# Patient Record
Sex: MALE | Race: WHITE | NOT HISPANIC OR LATINO | Employment: FULL TIME | URBAN - METROPOLITAN AREA
[De-identification: names, ages, dates, MRNs, and addresses within clinical notes are randomized per-mention and may not be internally consistent; named-entity substitution may affect disease eponyms.]

---

## 2019-09-05 ENCOUNTER — OCCUPATIONAL MEDICINE (OUTPATIENT)
Dept: URGENT CARE | Facility: CLINIC | Age: 29
End: 2019-09-05
Payer: COMMERCIAL

## 2019-09-05 VITALS
HEART RATE: 66 BPM | SYSTOLIC BLOOD PRESSURE: 112 MMHG | HEIGHT: 74 IN | OXYGEN SATURATION: 99 % | DIASTOLIC BLOOD PRESSURE: 70 MMHG | TEMPERATURE: 98.5 F | BODY MASS INDEX: 21.69 KG/M2 | WEIGHT: 169 LBS | RESPIRATION RATE: 18 BRPM

## 2019-09-05 DIAGNOSIS — S61.209A AVULSION OF FINGER, INITIAL ENCOUNTER: ICD-10-CM

## 2019-09-05 PROCEDURE — 99203 OFFICE O/P NEW LOW 30 MIN: CPT | Mod: 29 | Performed by: PHYSICIAN ASSISTANT

## 2019-09-05 NOTE — LETTER
[facility]  [address] [place] [address]  Phone:  [phone] - Fax:  [phone]   [facility] Progress Report and Disability Certification  Date of Service: 9/5/2019   No Show:  No  Date / Time of Next Visit: 9/7/2019   Claim Information   Patient Name: Mark Anthony Montez  Claim Number:     Employer:   [employer] Date of Injury: 9/5/2019     Insurer / TPA: Misc Workers Comp  ID / SSN:     Occupation:   Diagnosis: The encounter diagnosis was Avulsion of finger, initial encounter.    Medical Information   Related to Industrial Injury? Yes    Subjective Complaints:  DOI: today-patient was using a sharp knife cutting lettuce when he accidentally cut the radial aspect of the left index finger with a knife.  He reports immediate bleeding-however this slowed with the initiation of pressure.  This happened approximately 3 hours ago.  Patient is up-to-date on his tetanus.  Denies second job   Objective Findings: Left avulsion partial-thickness to the radial aspect of the distal right fingertip with minimal involvement of the nail.  Active bleeding.  Wound was irrigated and scrubbed with Hibiclens and sterile saline solution.  Neurovascularly intact distally.  Without involvement of deeper structures of the finger and full range of motion.  No foreign body identified  Procedure: Avulsion- cautery.   -Risks including bleeding, nerve damage, infection, and poor cosmetic outcome discussed. Benefits and alternatives discussed.   -Clean technique with sterile instruments and suture used  -Local anesthesia with 2% lidocaine without-along with digital block  -Cautery was utilized to provide hemostasis to the site  -Polysporin and pressure bandage  placed  -Patient tolerated well         Pre-Existing Condition(s):     Assessment:   Initial Visit    Status: Additional Care Required  Permanent Disability:No    Plan:      Diagnostics:      Comments:          Disability Information   Status: Released to Restricted Duty    From:  9/5/2019  Through: 9/7/2019 Restrictions are: Temporary   Physical Restrictions   Sitting:    Standing:    Stooping:    Bending:      Squatting:    Walking:    Climbing:    Pushing:      Pulling:    Other:    Reaching Above Shoulder (L):   Reaching Above Shoulder (R):       Reaching Below Shoulder (L):    Reaching Below Shoulder (R):      Not to exceed Weight Limits   Carrying(hrs):   Weight Limit(lb):   Lifting(hrs):   Weight  Limit(lb):     Comments: Patient is to keep the area clean and dry and bandaged.  No use of the left hand.  Return to clinic in 2 days for recheck.  Tetanus is up-to-date.    Repetitive Actions   Hands: i.e. Fine Manipulations from Grasping:     Feet: i.e. Operating Foot Controls:     Driving / Operate Machinery:     Physician Name: Albert Easton P.A.-C. Physician Signature: ALBERT Nichols P.A.-C. e-Signature: Dr. Tito Garcia, Medical Director   Clinic Name / Location: [facility]  [address] [place] [address] Clinic Phone Number: Dept: [phone]   Appointment Time: 5:30 Pm Visit Start Time: 5:45 PM   Check-In Time:  5:26 Pm Visit Discharge Time:  06:43 pm    Original-Treating Physician or Chiropractor    Page 2-Insurer/TPA    Page 3-Employer    Page 4-Employee

## 2019-09-05 NOTE — LETTER
"EMPLOYEE’S CLAIM FOR COMPENSATION/ REPORT OF INITIAL TREATMENT  FORM C-4    EMPLOYEE’S CLAIM - PROVIDE ALL INFORMATION REQUESTED   First Name  Mark Anthony Last Name  Melida Birthdate                    1990                Sex  male Claim Number   Home Address  [address] Age  29 y.o. Height  1.88 m (6' 2\") Weight  76.7 kg (169 lb) [facility]     [place] Zip  [address] Telephone  [phone] (home)    Mailing Address  [address] [place] Zip  [address] Primary Language Spoken  English    Insurer   Third Party   Misc Workers Comp   Employee's Occupation (Job Title) When Injury or Occupational Disease Occurred      Employer's Name    [employer] Telephone   [phone]   Employer Address   [address]  [facility] Zip   [address]   Date of Injury  9/5/2019               Hour of Injury  3:30 PM Date Employer Notified  9/5/2019 Last Day of Work after Injury or Occupational Disease  9/5/2019 Supervisor to Whom Injury Reported  jona joyce   Address or Location of Accident (if applicable)  [[address] ]   What were you doing at the time of accident? (if applicable)  cutting lettes    How did this injury or occupational disease occur? (Be specific an answer in detail. Use additional sheet if necessary)  knife slip and not paying to much attention    If you believe that you have an occupational disease, when did you first have knowledge of the disability and it relationship to your employment?   Witnesses to the Accident  n/a      Nature of Injury or Occupational Disease  Laceration  Part(s) of Body Injured or Affected  Finger (L), N/A, N/A    I certify that the above is true and correct to the best of my knowledge and that I have provided this information in order to obtain the benefits of [place] and " [place]  I hereby authorize any physician, chiropractor, surgeon, practitioner, or other person, any hospital, including [facility] or [facility], any medical service organization, any insurance company, or other institution or organization to release to each other, any medical or other information, including benefits paid or payable, pertinent to this injury or disease, except information relative to diagnosis, treatment and/or counseling for AIDS, psychological conditions, alcohol or controlled substances, for which I must give specific authorization.  A Photostat of this authorization shall be as valid as the original.     Date 09/05/19   [facility]    Employee’s Signature   THIS REPORT MUST BE COMPLETED AND MAILED WITHIN 3 WORKING DAYS OF TREATMENT   [facility]  Name of Facility  [facility]   Date  9/5/2019 Diagnosis  (S61.209A) Avulsion of finger, initial encounter Is there evidence the injured employee was under the influence of alcohol and/or another controlled substance at the time of accident?   Hour  5:45 PM Description of Injury or Disease  The encounter diagnosis was Avulsion of finger, initial encounter. No   Treatment  Avulsion laceration of the left second digit-hemostasis was completed unfortunately unable to suture area.  Hemostasis was successful and pressure bandage applied.  Return to clinic in 2 days.  Tetanus is up-to-date.  Have you advised the patient to remain off work five days or more? No   X-Ray Findings      If Yes   From Date  To Date      From information given by the employee, together with medical evidence, can you directly connect this injury or occupational disease as job incurred?  Yes If No Full Duty  No Modified Duty  Yes   Is additional medical care by a physician indicated?  No If Modified Duty, Specify any Limitations / Restrictions  No use  "of the left hand   Do you know of any previous injury or disease contributing to this condition or occupational disease?                            No   Date  9/5/2019 Print Doctor’s Name Albert Easton P.A.-C. I certify the employer’s copy of  this form was mailed on:   Address  [address]     [facility] Zip  [address]    Provider’s Tax ID Number  [id number] Telephone  Dept: [phone]        e-ALBERT Marcelino P.A.-C.   e-Signature: Dr. Tito Garcia, Medical Director Degree  MD        ORIGINAL-TREATING PHYSICIAN OR CHIROPRACTOR    PAGE 2-INSURER/TPA    PAGE 3-EMPLOYER    PAGE 4-EMPLOYEE             Form C-4 (rev.10/07)              BRIEF DESCRIPTION OF RIGHTS AND BENEFITS  (Pursuant to [place] 616C.050)    Notice of Injury or Occupational Disease (Incident Report Form C-1): If an injury or occupational disease (OD) arises out of and in the course of employment, you must provide written notice to your employer as soon as practicable, but no later than 7 days after the accident or OD. Your employer shall maintain a sufficient supply of the required forms.    Claim for Compensation (Form C-4): If medical treatment is sought, the form C-4 is available at the place of initial treatment. A completed \"Claim for Compensation\" (Form C-4) must be filed within 90 days after an accident or OD. The treating physician or chiropractor must, within 3 working days after treatment, complete and mail to the employer, the employer's insurer and third-party , the Claim for Compensation.    Medical Treatment: If you require medical treatment for your on-the-job injury or OD, you may be required to select a physician or chiropractor from a list provided by your workers’ compensation insurer, if it has contracted with an Organization for Managed Care (MCO) or Preferred Provider Organization (PPO) or providers of health care. If your employer has not entered into a contract " with an MCO or PPO, you may select a physician or chiropractor from the Panel of Physicians and Chiropractors. Any medical costs related to your industrial injury or OD will be paid by your insurer.    Temporary Total Disability (TTD): If your doctor has certified that you are unable to work for a period of at least 5 consecutive days, or 5 cumulative days in a 20-day period, or places restrictions on you that your employer does not accommodate, you may be entitled to TTD compensation.    Temporary Partial Disability (TPD): If the wage you receive upon reemployment is less than the compensation for TTD to which you are entitled, the insurer may be required to pay you TPD compensation to make up the difference. TPD can only be paid for a maximum of 24 months.    Permanent Partial Disability (PPD): When your medical condition is stable and there is an indication of a PPD as a result of your injury or OD, within 30 days, your insurer must arrange for an evaluation by a rating physician or chiropractor to determine the degree of your PPD. The amount of your PPD award depends on the date of injury, the results of the PPD evaluation and your age and wage.    Permanent Total Disability (PTD): If you are medically certified by a treating physician or chiropractor as permanently and totally disabled and have been granted a PTD status by your insurer, you are entitled to receive monthly benefits not to exceed 66 2/3% of your average monthly wage. The amount of your PTD payments is subject to reduction if you previously received a PPD award.    Vocational Rehabilitation Services: You may be eligible for vocational rehabilitation services if you are unable to return to the job due to a permanent physical impairment or permanent restrictions as a result of your injury or occupational disease.    Transportation and Per Torey Reimbursement: You may be eligible for travel expenses and per torey associated with medical  treatment.    Reopening: You may be able to reopen your claim if your condition worsens after claim closure.    Appeal Process: If you disagree with a written determination issued by the insurer or the insurer does not respond to your request, you may appeal to the Department of Administration, , by following the instructions contained in your determination letter. You must appeal the determination within 70 days from the date of the determination letter at [address], [address], [place], [place] [address], or [address], [place], [place] [address]. If you disagree with the  decision, you may appeal to the Department of Administration, . You must file your appeal within 30 days from the date of the  decision letter at [address], [address], [place], [place] [address], or [address] [facility] [address], [place], [place] [address]. If you disagree with a decision of an , you may file a petition for judicial review with the District Court. You must do so within 30 days of the Appeal Officer’s decision. You may be represented by an  at your own expense or you may contact the [facility] for possible representation.    [place]  for Injured Workers (NAIW): If you disagree with a  decision, you may request that NAIW represent you without charge at an  Hearing. For information regarding denial of benefits, you may contact the [facility] at: [address], [address], [place] [address], [phone], or [address], [address], [place] [address], [phone]    To File a Complaint with the Division: If you wish to file a complaint with the  of the Division of Industrial Relations (DIR),  please contact the Workers’ Compensation Section, [address], [address], [place], [place] [address], telephone [phone], or [address], [address], [place], [place] [address], telephone [phone].    For assistance with Workers’ Compensation Issues: you may contact the Office of the Governor Consumer Health Assistance, [facility] [address], [address], [place], [place] [address], Toll Free 1-252.736.3377, Web site: http://[employer].[facility].nv., E-mail [email or web address].                             __________________________________________________________________                                                                   _________________                Employee Name / Signature                                                                                                                                                       Date                                                                                                                                                                                                     D-2 (rev. 06/18)

## 2019-09-06 ENCOUNTER — TELEPHONE (OUTPATIENT)
Dept: URGENT CARE | Facility: CLINIC | Age: 29
End: 2019-09-06

## 2019-09-06 ASSESSMENT — ENCOUNTER SYMPTOMS
FEVER: 0
TINGLING: 0
CHILLS: 0
SENSORY CHANGE: 0

## 2019-09-06 NOTE — TELEPHONE ENCOUNTER
In regards to Workers compensation, noe tried to get in touch with the employer and I was given a few numbers all which I called and never got a hold of anybody , I left voivemails and still nobody has returned my calls . I am unsure of who the insurer is and as to where or how the employer would like to receive paperwork. So I haven't been able to send any paperwork. I did reach out to occupational health to see if they have any information and I'm still waiting to get an answer.  I first reached out to the  jona joyce #[phone] he didn't have an idea of what I was talking about and gave me a number to talk to

## 2019-09-06 NOTE — PROGRESS NOTES
"Subjective:      Mark Anthony Montez is a 29 y.o. male who presents with Hand Laceration (New WC: LT index finger laceration)      DOI: today-patient was using a sharp knife cutting lettuce when he accidentally cut the radial aspect of the left index finger with a knife.  He reports immediate bleeding-however this slowed with the initiation of pressure.  This happened approximately 3 hours ago.  Patient is up-to-date on his tetanus.  Denies second job     Laceration    The pain is at a severity of 6/10. The pain is moderate. The pain has been constant since onset. He reports no foreign bodies present. His tetanus status is UTD.       Review of Systems   Constitutional: Negative for chills and fever.   Musculoskeletal:        Left finger avulsion.      Skin: Negative for itching and rash.   Neurological: Negative for tingling and sensory change.   All other systems reviewed and are negative.         Objective:     /70 (BP Location: Right arm, Patient Position: Sitting, BP Cuff Size: Adult)   Pulse 66   Temp 36.9 °C (98.5 °F) (Temporal)   Resp 18   Ht 1.88 m (6' 2\")   Wt 76.7 kg (169 lb)   SpO2 99%   BMI 21.70 kg/m²    PMH:  has no past medical history on file.  MEDS: No current outpatient medications on file.  ALLERGIES: No Known Allergies  SURGHX: No past surgical history on file.  SOCHX:  reports that he has never smoked. He has never used smokeless tobacco.  FH: Family history was reviewed, no pertinent findings to report    Physical Exam   Constitutional: He is oriented to person, place, and time. He appears well-developed and well-nourished. No distress.   HENT:   Head: Normocephalic and atraumatic.   Eyes: Pupils are equal, round, and reactive to light. Conjunctivae and EOM are normal.   Neck: Normal range of motion. Neck supple. No tracheal deviation present.   Cardiovascular: Normal rate.   Pulmonary/Chest: Effort normal. No respiratory distress.   Neurological: He is alert and oriented to person, " place, and time. Coordination normal.   Skin: Skin is warm. No rash noted.   Psychiatric: He has a normal mood and affect. His behavior is normal. Judgment and thought content normal.   Vitals reviewed.      Left avulsion partial-thickness to the radial aspect of the distal right fingertip with minimal involvement of the nail.  Active bleeding.  Wound was irrigated and scrubbed with Hibiclens and sterile saline solution.  Neurovascularly intact distally.  Without involvement of deeper structures of the finger and full range of motion.  No foreign body identified  Procedure: Avulsion- cautery.   -Risks including bleeding, nerve damage, infection, and poor cosmetic outcome discussed. Benefits and alternatives discussed.   -Clean technique with sterile instruments and suture used  -Local anesthesia with 2% lidocaine without-along with digital block  -Cautery was utilized to provide hemostasis to the site  -Polysporin and pressure bandage  placed  -Patient tolerated well             Assessment/Plan:     1. Avulsion of finger, initial encounter    Hemostasis successfful. Pressure bandage applied.   Please see D39 for further information.   Pt. Is to have recheck in 2 days- to ensure area is not infected.   OTC pain meds as tolerated.

## 2019-09-07 ENCOUNTER — OCCUPATIONAL MEDICINE (OUTPATIENT)
Dept: URGENT CARE | Facility: CLINIC | Age: 29
End: 2019-09-07
Payer: COMMERCIAL

## 2019-09-07 VITALS
SYSTOLIC BLOOD PRESSURE: 116 MMHG | WEIGHT: 173.2 LBS | RESPIRATION RATE: 16 BRPM | DIASTOLIC BLOOD PRESSURE: 54 MMHG | BODY MASS INDEX: 22.23 KG/M2 | OXYGEN SATURATION: 99 % | HEART RATE: 95 BPM | TEMPERATURE: 99.1 F | HEIGHT: 74 IN

## 2019-09-07 DIAGNOSIS — S61.209D AVULSION OF FINGER, SUBSEQUENT ENCOUNTER: ICD-10-CM

## 2019-09-07 PROCEDURE — 99214 OFFICE O/P EST MOD 30 MIN: CPT | Mod: 29 | Performed by: NURSE PRACTITIONER

## 2019-09-07 ASSESSMENT — ENCOUNTER SYMPTOMS
DIZZINESS: 0
FEVER: 0
SHORTNESS OF BREATH: 0
NAUSEA: 0
CHILLS: 0
VOMITING: 0
EYE PAIN: 0
SORE THROAT: 0
MYALGIAS: 0

## 2019-09-07 NOTE — LETTER
"   [facility]  [address] [place] [address]  Phone:  [phone] - Fax:  [phone]   [facility] Progress Report and Disability Certification  Date of Service: 9/7/2019   No Show:  No  Date / Time of Next Visit: 9/10/2019   Claim Information   Patient Name: Mark Anthony Montez  Claim Number:     Employer:   [employer]  Date of Injury: 9/5/2019     Insurer / TPA:   [employer]KITA ID / SSN:     Occupation:   Diagnosis: The encounter diagnosis was Avulsion of finger, subsequent encounter.    Medical Information   Related to Industrial Injury? Yes    Subjective Complaints:  DOI: today-patient was using a sharp knife cutting lettuce when he accidentally cut the radial aspect of the left index finger with a knife.\"  Initial treatment included cauterization to stop the bleeding.  Patient was placed on temporary work restrictions.  Patient returns clinic today for first follow-up visit.  Patient has not changed the dressing since initial visit.  Patient having 10 out of 10 pain at this time.  He has been taking ibuprofen with no relief in symptoms.  Patient has been back to work and tolerated light duties.   Objective Findings: Left index finger: Avulsion noted of the lateral aspect of DIP.  No bleeding.  No new erythema, no swelling, no drainage.  Full range of motion.  Sensation intact distally, neurovascular intact.  Nail intact.   Pre-Existing Condition(s):     Assessment:   Condition Improved    Status: Additional Care Required  Permanent Disability:No    Plan:   Comments:Redressed finger with Polysporin, nontender gauze, bandage.  Will have patient start daily bandage changes.  We will keep patient on light duty    Diagnostics:      Comments:       Disability Information   Status: Released to Restricted Duty    From:  9/7/2019  Through: 9/10/2019 Restrictions are: Temporary   Physical Restrictions   Sitting:    Standing:    Stooping:    Bending:  "   Squatting:    Walking:    Climbing:    Pushing:      Pulling:    Other:    Reaching Above Shoulder (L):   Reaching Above Shoulder (R):       Reaching Below Shoulder (L):    Reaching Below Shoulder (R):      Not to exceed Weight Limits   Carrying(hrs):   Weight Limit(lb):   Lifting(hrs):   Weight  Limit(lb):     Comments: Advised patient to keep hand clean and dry and bandaged.  We will keep patient on temporary work restrictions with limited use of the left hand. Advised may take 600-800 mg ibuprofen 3 times daily as needed for pain.  Did recommend alternating with Tylenol.  We will see patient back in 3 days for reevaluation of symptoms    Repetitive Actions   Hands: i.e. Fine Manipulations from Graspin hrs/day  Comments:Left hand   Feet: i.e. Operating Foot Controls:     Driving / Operate Machinery:     Physician Name: JOSH Lew Physician Signature: PAMELA Campbell e-Signature: Dr. Tito Garcia, Medical Director   Clinic Name / Location: [facility]  [address] [place] [address] Clinic Phone Number: Dept: [phone]   Appointment Time: 4:30 Pm Visit Start Time: 4:34 PM   Check-In Time:  3:43 Pm Visit Discharge Time:  05:02 PM    Original-Treating Physician or Chiropractor    Page 2-Insurer/TPA    Page 3-Employer    Page 4-Employee

## 2019-09-08 NOTE — PROGRESS NOTES
"Subjective:   Mark Anthony Montez is a 29 y.o. male who presents for Hand Laceration (WC FV laceration of LT index finger)    DOI: today-patient was using a sharp knife cutting lettuce when he accidentally cut the radial aspect of the left index finger with a knife.\"  Initial treatment included cauterization to stop the bleeding.  Patient was placed on temporary work restrictions.  Patient returns clinic today for first follow-up visit.  Patient has not changed the dressing since initial visit.  Patient having 10 out of 10 pain at this time.  He has been taking ibuprofen with no relief in symptoms.  Patient has been back to work and tolerated light duties.   HPI  Review of Systems   Constitutional: Negative for chills and fever.   HENT: Negative for sore throat.    Eyes: Negative for pain.   Respiratory: Negative for shortness of breath.    Cardiovascular: Negative for chest pain.   Gastrointestinal: Negative for nausea and vomiting.   Genitourinary: Negative for hematuria.   Musculoskeletal: Negative for myalgias.        Dressing in place of left index finger     Skin: Negative for rash.   Neurological: Negative for dizziness.     No Known Allergies   Objective:   /54 (BP Location: Left arm, Patient Position: Sitting, BP Cuff Size: Adult)   Pulse 95   Temp 37.3 °C (99.1 °F) (Temporal)   Resp 16   Ht 1.88 m (6' 2\")   Wt 78.6 kg (173 lb 3.2 oz)   SpO2 99%   BMI 22.24 kg/m²   Physical Exam   Constitutional: He is oriented to person, place, and time. He appears well-developed and well-nourished. No distress.   HENT:   Head: Normocephalic and atraumatic.   Eyes: Pupils are equal, round, and reactive to light. Conjunctivae and EOM are normal.   Cardiovascular: Normal rate and regular rhythm.   No murmur heard.  Pulmonary/Chest: Effort normal and breath sounds normal. No respiratory distress.   Abdominal: Soft. He exhibits no distension. There is no tenderness.   Musculoskeletal:        Left hand: He exhibits " tenderness. He exhibits normal range of motion, normal two-point discrimination and no swelling. Normal sensation noted. Normal strength noted.        Hands:  Neurological: He is alert and oriented to person, place, and time. He has normal reflexes. No sensory deficit.   Skin: Skin is warm and dry.   Psychiatric: He has a normal mood and affect.   Left index finger: Avulsion noted of the radial aspect of DIP.  No bleeding.  No new erythema, no swelling, no drainage.  Full range of motion.  Sensation intact distally, neurovascular intact.  Nail intact.    Assessment/Plan:   1. Avulsion of finger, subsequent encounter    Advised patient to keep hand clean and dry and bandaged.  Advised to change the bandage daily.  We will keep patient on temporary work restrictions with limited use of the left hand. Advised may take 600-800 mg ibuprofen 3 times daily as needed for pain.  Did recommend alternating with Tylenol.  We will see patient back in 3 days for reevaluation of symptoms.   Differential diagnosis, natural history, supportive care, and indications for immediate follow-up discussed.

## 2019-09-10 ENCOUNTER — OCCUPATIONAL MEDICINE (OUTPATIENT)
Dept: URGENT CARE | Facility: CLINIC | Age: 29
End: 2019-09-10
Payer: COMMERCIAL

## 2019-09-10 VITALS
SYSTOLIC BLOOD PRESSURE: 114 MMHG | DIASTOLIC BLOOD PRESSURE: 60 MMHG | TEMPERATURE: 98.7 F | OXYGEN SATURATION: 98 % | HEART RATE: 78 BPM | RESPIRATION RATE: 16 BRPM

## 2019-09-10 DIAGNOSIS — S61.209D AVULSION OF FINGER, SUBSEQUENT ENCOUNTER: ICD-10-CM

## 2019-09-10 PROCEDURE — 99214 OFFICE O/P EST MOD 30 MIN: CPT | Mod: 29 | Performed by: PHYSICIAN ASSISTANT

## 2019-09-10 RX ORDER — CEPHALEXIN 500 MG/1
500 CAPSULE ORAL 4 TIMES DAILY
Qty: 20 CAP | Refills: 0 | Status: SHIPPED | OUTPATIENT
Start: 2019-09-10 | End: 2019-09-15

## 2019-09-10 NOTE — LETTER
[facility]  [address] [place] [address]  Phone:  [phone] - Fax:  [phone]   [facility] Progress Report and Disability Certification  Date of Service: 9/10/2019   No Show:  No  Date / Time of Next Visit: 9/12/2019 @5PM   Claim Information   Patient Name: Mark Anthony Montez  Claim Number:  N/A   Employer:  [employer] Date of Injury: 9/5/2019     Insurer / TPA: [employer]  ID / SSN:     Occupation:   Diagnosis: The encounter diagnosis was Avulsion of finger, subsequent encounter.    Medical Information   Related to Industrial Injury? Yes    Subjective Complaints:  DOI: 9/5- visit #3- Pt has been keeping it clean and covered- no dressing change since last visit- continues to report pain. Wearing a bag at work.   patient was using a sharp knife cutting lettuce when he accidentally cut the radial aspect of the left index finger with a knife.    Objective Findings: Left 2nd digit- dressing removed- minimal bleeding from the site. Noted maceration surrounding wound then with noted erythema, slight edema- no discharge. woud irrigated. Hemostasis successful- dressing applied.    Pre-Existing Condition(s):     Assessment:   Condition Same    Status: Additional Care Required  Permanent Disability:No    Plan:      Diagnostics:      Comments:       Disability Information   Status: Released to Restricted Duty    From:  9/10/2019  Through: 9/12/2019 Restrictions are: Temporary   Physical Restrictions   Sitting:    Standing:    Stooping:    Bending:      Squatting:    Walking:    Climbing:    Pushing:      Pulling:    Other:    Reaching Above Shoulder (L):   Reaching Above Shoulder (R):       Reaching Below Shoulder (L):    Reaching Below Shoulder (R):      Not to exceed Weight Limits   Carrying(hrs):   Weight Limit(lb):   Lifting(hrs):   Weight  Limit(lb):     Comments: Suspect that maceration is secondary to patient wearing the bag and worsening  condensation.  Patient with slight edema and erythema surrounding the site.  Patient is a few days out since original injury and concern for secondary bacterial infection we will start the patient on cephalexin at this time.  Patient is to continue to keep the area clean and dry and covered.  Return to clinic clinic in 2 days for recheck.    Repetitive Actions   Hands: i.e. 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    Feet: i.e. Operating Foot Controls:     Driving / Operate Machinery:     Physician Name: Albert Easton P.A.-C. Physician Signature: ALBERT Nichols P.A.-C. e-Signature: Dr. Tito Garcia, Medical Director   Clinic Name / Location: [facility] [address] [address] Clinic Phone Number: Dept: [phone]   Appointment Time: 5:00 Pm Visit Start Time: 4:57 PM   Check-In Time:  4:45 Pm Visit Discharge Time:  5:35 PM   Original-Treating Physician or Chiropractor    Page 2-Insurer/TPA    Page 3-Employer    Page 4-Employee

## 2019-09-11 ASSESSMENT — ENCOUNTER SYMPTOMS
CHILLS: 0
TINGLING: 1
FEVER: 0
SENSORY CHANGE: 0
FALLS: 0

## 2019-09-11 NOTE — PROGRESS NOTES
Subjective:      Mark Anthony Montez is a 29 y.o. male who presents with Finger Injury (WC FV left index finger)      DOI: 9/5- visit #3- Pt has been keeping it clean and covered- no dressing change since last visit- continues to report pain. Wearing a bag at work.   patient was using a sharp knife cutting lettuce when he accidentally cut the radial aspect of the left index finger with a knife.    Prior treatment was cautery and dressing.     Wound Check   Previous treatment included wound cleansing or irrigation (9/5). There has been no drainage from the wound. There is new redness present. The swelling has not changed. The pain has improved.       Review of Systems   Constitutional: Negative for chills and fever.   Musculoskeletal: Positive for joint pain. Negative for falls.   Neurological: Positive for tingling. Negative for sensory change.   All other systems reviewed and are negative.         Objective:     /60 (BP Location: Left arm, Patient Position: Sitting, BP Cuff Size: Adult)   Pulse 78   Temp 37.1 °C (98.7 °F) (Temporal)   Resp 16   SpO2 98%      PMH: No pertinent past medical history to this problem  MEDS: Medications were reviewed in Epic  ALLERGIES: Allergies were reviewed in Epic  SOCHX: Works as a .   FH: No pertinent family history to this problem    Physical Exam   Constitutional: He is oriented to person, place, and time. He appears well-developed and well-nourished. No distress.   HENT:   Head: Normocephalic and atraumatic.   Eyes: Pupils are equal, round, and reactive to light. Conjunctivae and EOM are normal.   Neck: Normal range of motion. Neck supple. No tracheal deviation present.   Cardiovascular: Normal rate.   Pulmonary/Chest: Effort normal. No respiratory distress.   Musculoskeletal: He exhibits no edema.   Neurological: He is alert and oriented to person, place, and time.   Skin: Skin is warm. No rash noted.   Psychiatric: He has a normal mood and affect. His behavior is  normal. Judgment and thought content normal.   Vitals reviewed.      Left 2nd digit- dressing removed- minimal bleeding from the site. Noted maceration surrounding wound then with noted erythema, slight edema- no discharge. woud irrigated. Hemostasis successful- dressing applied.        Assessment/Plan:     1. Avulsion of finger, subsequent encounter  - cephALEXin (KEFLEX) 500 MG Cap; Take 1 Cap by mouth 4 times a day for 5 days.  Dispense: 20 Cap; Refill: 0  Please see D39.   Pt. Is having to wear a plastic bag at work over his finger- causing notable maceration- pt. With new erythema- will start him on Keflex.   Wound rebled slightly today.   This was successfully stopped with irrigation and pressure.   RTC in 2 days.

## 2019-09-18 ENCOUNTER — OCCUPATIONAL MEDICINE (OUTPATIENT)
Dept: URGENT CARE | Facility: CLINIC | Age: 29
End: 2019-09-18
Payer: COMMERCIAL

## 2019-09-18 VITALS
SYSTOLIC BLOOD PRESSURE: 120 MMHG | OXYGEN SATURATION: 95 % | RESPIRATION RATE: 16 BRPM | DIASTOLIC BLOOD PRESSURE: 64 MMHG | HEART RATE: 84 BPM | TEMPERATURE: 98.9 F

## 2019-09-18 DIAGNOSIS — S61.209D AVULSION OF FINGER, SUBSEQUENT ENCOUNTER: ICD-10-CM

## 2019-09-18 PROCEDURE — 99213 OFFICE O/P EST LOW 20 MIN: CPT | Mod: 29 | Performed by: PHYSICIAN ASSISTANT

## 2019-09-18 ASSESSMENT — ENCOUNTER SYMPTOMS
VOMITING: 0
DIZZINESS: 0
FEVER: 0
MUSCULOSKELETAL NEGATIVE: 1
DIARRHEA: 0
ABDOMINAL PAIN: 0
CHILLS: 0
SHORTNESS OF BREATH: 0
NAUSEA: 0

## 2019-09-18 NOTE — PROGRESS NOTES
Subjective:      Mark Anthnoy Montez is a 29 y.o. male who presents with Finger Injury ([employer] FV , left finger, wants clearance to go back to work)      DOI: 9/5/19 - visit #4- Patient was using a sharp knife cutting lettuce when he accidentally cut the radial aspect of the left index finger with a knife.    Prior treatment was cautery and dressing. He was given a course of Keflex at his previous visit, which he has completed. No concern for infection. No residual pain. He is ready to return to full duty.         HPI    Review of Systems   Constitutional: Negative for chills and fever.   HENT: Negative for congestion.    Respiratory: Negative for shortness of breath.    Cardiovascular: Negative for chest pain.   Gastrointestinal: Negative for abdominal pain, diarrhea, nausea and vomiting.   Genitourinary: Negative.    Musculoskeletal: Negative.    Skin: Negative for rash.        Finger avulsion   Neurological: Negative for dizziness.        Objective:     /64 (BP Location: Left arm, Patient Position: Sitting, BP Cuff Size: Adult)   Pulse 84   Temp 37.2 °C (98.9 °F) (Temporal)   Resp 16   SpO2 95%      Physical Exam   Constitutional: He is oriented to person, place, and time. He appears well-developed and well-nourished. No distress.   HENT:   Head: Normocephalic and atraumatic.   Eyes: Pupils are equal, round, and reactive to light. Conjunctivae are normal.   Neck: Normal range of motion.   Cardiovascular: Normal rate.   Pulmonary/Chest: Effort normal.   Musculoskeletal: Normal range of motion.   Neurological: He is alert and oriented to person, place, and time.   Skin: Skin is warm and dry. He is not diaphoretic.        Well healing skin avulsion to radial aspect of left distal index finger. No surrounding erythema, edema, or discharge from the site. Distally n/v intact.    Psychiatric: He has a normal mood and affect. His behavior is normal.   Nursing note and vitals reviewed.         PMH:  has no past medical  history on file.  MEDS: No current outpatient medications on file.  ALLERGIES: No Known Allergies  SURGHX: History reviewed. No pertinent surgical history.  SOCHX:  reports that he has never smoked. He has never used smokeless tobacco.  FH: family history is not on file.       Assessment/Plan:     1. Avulsion of finger, subsequent encounter    Wound without evidence of infection  Healing well  Discharged/MMI today

## 2019-09-18 NOTE — LETTER
[facility]  [address] [place] [address]  Phone:  [phone] - Fax:  [phone]   [facility] Progress Report and Disability Certification  Date of Service: 9/18/2019   No Show:  No  Date / Time of Next Visit:     Claim Information   Patient Name: Mark Anthony Montez  Claim Number:     Employer:   [employer] Date of Injury: 9/5/2019     Insurer / TPA: [employer]  ID / SSN:     Occupation:  Diagnosis: The encounter diagnosis was Avulsion of finger, subsequent encounter.    Medical Information   Related to Industrial Injury? Yes    Subjective Complaints:  DOI: 9/5/19 - visit #4- Patient was using a sharp knife cutting lettuce when he accidentally cut the radial aspect of the left index finger with a knife.    Prior treatment was cautery and dressing. He was given a course of Keflex at his previous visit, which he has completed. No concern for infection. No residual pain. He is ready to return to full duty.       Objective Findings: Left hand: Well healing skin avulsion to radial aspect of left distal index finger. No surrounding erythema, edema, or discharge from the site. Distally n/v intact.     Pre-Existing Condition(s): None    Assessment:   Condition Improved    Status: Discharged /  MMI  Permanent Disability:No    Plan:      Diagnostics:      Comments:       Disability Information   Status: Released to Full Duty    From:     Through:   Restrictions are:     Physical Restrictions   Sitting:    Standing:    Stooping:    Bending:      Squatting:    Walking:    Climbing:    Pushing:      Pulling:    Other:    Reaching Above Shoulder (L):   Reaching Above Shoulder (R):       Reaching Below Shoulder (L):    Reaching Below Shoulder (R):      Not to exceed Weight Limits   Carrying(hrs):   Weight Limit(lb):   Lifting(hrs):   Weight  Limit(lb):     Comments: Wound without evidence of infection  Healing well  Discharged/MMI today    Repetitive  Actions   Hands: i.e. Fine Manipulations from Grasping:     Feet: i.e. Operating Foot Controls:     Driving / Operate Machinery:     Physician Name: Winnie Cope P.A.-C. Physician Signature: WINNIE Fung P.A.-C. e-Signature: Dr. Tito Garcia, Medical Director   Clinic Name / Location: [address] [address] Clinic Phone Number: Dept: [phone]   Appointment Time: 2:00 Pm Visit Start Time: 2:11 PM   Check-In Time:  2:00 Pm Visit Discharge Time: 2:36 PM   Original-Treating Physician or Chiropractor    Page 2-Insurer/TPA    Page 3-Employer    Page 4-Employee

## 2021-08-16 ENCOUNTER — APPOINTMENT (OUTPATIENT)
Dept: RADIOLOGY | Facility: IMAGING CENTER | Age: 31
End: 2021-08-16
Attending: PHYSICIAN ASSISTANT
Payer: COMMERCIAL

## 2021-08-16 ENCOUNTER — OFFICE VISIT (OUTPATIENT)
Dept: URGENT CARE | Facility: CLINIC | Age: 31
End: 2021-08-16
Payer: COMMERCIAL

## 2021-08-16 VITALS
DIASTOLIC BLOOD PRESSURE: 70 MMHG | SYSTOLIC BLOOD PRESSURE: 106 MMHG | HEART RATE: 84 BPM | TEMPERATURE: 97.9 F | WEIGHT: 177 LBS | BODY MASS INDEX: 22.72 KG/M2 | RESPIRATION RATE: 14 BRPM | OXYGEN SATURATION: 96 % | HEIGHT: 74 IN

## 2021-08-16 DIAGNOSIS — S99.921A INJURY OF TOE ON RIGHT FOOT, INITIAL ENCOUNTER: ICD-10-CM

## 2021-08-16 PROCEDURE — 99213 OFFICE O/P EST LOW 20 MIN: CPT | Performed by: PHYSICIAN ASSISTANT

## 2021-08-16 PROCEDURE — 73630 X-RAY EXAM OF FOOT: CPT | Mod: TC,RT | Performed by: PHYSICIAN ASSISTANT

## 2021-08-16 ASSESSMENT — ENCOUNTER SYMPTOMS
SENSORY CHANGE: 0
CHILLS: 0
FEVER: 0
TINGLING: 0
VOMITING: 0
NAUSEA: 0

## 2021-08-16 NOTE — LETTER
August 16, 2021       Patient: Mark Anthony Montez   YOB: 1990   Date of Visit: 8/16/2021         To Whom It May Concern:    In my medical opinion, I recommend that Mark Anthony Montez should be excused from work for today, 8/16/2021.      If you have any questions or concerns, please don't hesitate to call [phone]          Sincerely,          Jordan Tomas P.A.-C.  Electronically Signed

## 2023-10-26 ENCOUNTER — NON-PROVIDER VISIT (OUTPATIENT)
Dept: URGENT CARE | Facility: CLINIC | Age: 33
End: 2023-10-26

## 2023-10-26 DIAGNOSIS — Z02.1 PRE-EMPLOYMENT DRUG SCREENING: ICD-10-CM

## 2023-10-26 LAB
AMP AMPHETAMINE: NORMAL
BAR BARBITURATES: NORMAL
BZO BENZODIAZEPINES: NORMAL
COC COCAINE: NORMAL
INT CON NEG: NEGATIVE
INT CON POS: POSITIVE
MDMA ECSTASY: NORMAL
MET METHAMPHETAMINES: NORMAL
MTD METHADONE: NORMAL
OPI OPIATES: NORMAL
OXY OXYCODONE: NORMAL
PCP PHENCYCLIDINE: NORMAL
POC URINE DRUG SCREEN OCDRS: NEGATIVE
THC: NORMAL

## 2023-10-26 PROCEDURE — 80305 DRUG TEST PRSMV DIR OPT OBS: CPT | Performed by: NURSE PRACTITIONER

## 2024-01-10 ENCOUNTER — OFFICE VISIT (OUTPATIENT)
Dept: URGENT CARE | Facility: CLINIC | Age: 34
End: 2024-01-10
Payer: COMMERCIAL

## 2024-01-10 VITALS
DIASTOLIC BLOOD PRESSURE: 60 MMHG | RESPIRATION RATE: 14 BRPM | OXYGEN SATURATION: 98 % | BODY MASS INDEX: 22.38 KG/M2 | SYSTOLIC BLOOD PRESSURE: 104 MMHG | WEIGHT: 174.38 LBS | TEMPERATURE: 98.3 F | HEIGHT: 74 IN | HEART RATE: 89 BPM

## 2024-01-10 DIAGNOSIS — U07.1 COVID: ICD-10-CM

## 2024-01-10 PROBLEM — L20.9 ATOPIC DERMATITIS, UNSPECIFIED TYPE: Status: ACTIVE | Noted: 2022-08-05

## 2024-01-10 PROBLEM — M06.9 RHEUMATOID ARTHRITIS (HCC): Status: ACTIVE | Noted: 2022-08-05

## 2024-01-10 PROBLEM — B35.4 TINEA CORPORIS: Status: ACTIVE | Noted: 2022-08-05

## 2024-01-10 PROBLEM — L30.9 ECZEMA: Status: ACTIVE | Noted: 2022-08-05

## 2024-01-10 PROBLEM — M45.9 ANKYLOSING SPONDYLITIS (HCC): Status: ACTIVE | Noted: 2024-01-10

## 2024-01-10 PROBLEM — H53.8 BLURRY VISION: Status: ACTIVE | Noted: 2022-02-14

## 2024-01-10 PROCEDURE — 99213 OFFICE O/P EST LOW 20 MIN: CPT | Performed by: NURSE PRACTITIONER

## 2024-01-10 PROCEDURE — 3074F SYST BP LT 130 MM HG: CPT | Performed by: NURSE PRACTITIONER

## 2024-01-10 PROCEDURE — 3078F DIAST BP <80 MM HG: CPT | Performed by: NURSE PRACTITIONER

## 2024-01-10 ASSESSMENT — ENCOUNTER SYMPTOMS
DIARRHEA: 0
ORTHOPNEA: 0
HEADACHES: 1
CHILLS: 0
FEVER: 0
PALPITATIONS: 0
SORE THROAT: 1
WHEEZING: 0
HEMOPTYSIS: 0
DIAPHORESIS: 0
NAUSEA: 0
COUGH: 1
DIZZINESS: 0
SPUTUM PRODUCTION: 0
VOMITING: 0

## 2024-01-10 NOTE — LETTER
January 10, 2024         Patient: Mark Anthony Montez   YOB: 1990   Date of Visit: 1/10/2024           To Whom it May Concern:    Mark Anthony Montez was seen in my clinic on 1/10/2024. A concern for Covid-19 was identified and subsequent testing was positive for covid.  We are asking that you practice isolation protocol per Center for Disease Control (CDC) guidelines.  In general, this is a minimum of 5 days from the onset of symptoms, after which time you are cleared to end isolation and return to work as long as symptoms are improving and you are without fever, vomiting, or diarrhea.    In general, repeat testing is not necessary and not offered through our [facility].    This is the only note that will be provided from [facility] for this visit.  Your employee will require an appointment with a primary care provider if FMLA or disability forms are required.          Sincerely,           JOSH Oleary  Electronically Signed

## 2024-01-10 NOTE — PROGRESS NOTES
"Subjective     Mark Anthony Montez is a 33 y.o. male who presents with Coronavirus Screening (Positive covid )            Mark Anthony comes in today with URI symptoms.  He began feeling unwell yesterday with sore throat and fatigue.  Today he has headache, photosensitivity, rhinorrhea, sore throat, and a mild cough with chest tightness.  He denies any fever or chills.  No chest pain or overt shortness of breath.  He tested positive for covid today.  He is interested in Paxlovid.  He has rheumatoid arthritis and ankylosing spondylitis but is not currently taking any biologics or other Rx treatments.  He uses marijuana prn with good therapeutic relief.  He denies any chronic lung disease.  He denies any known renal or liver disease.  He is in general good health.  He is vaccinated against covid.  He had covid once historically over a year ago and recovered with OTC symptomatic management.          Review of Systems   Constitutional:  Positive for malaise/fatigue. Negative for chills, diaphoresis and fever.   HENT:  Positive for congestion and sore throat. Negative for ear discharge.    Respiratory:  Positive for cough. Negative for hemoptysis, sputum production and wheezing.    Cardiovascular:  Negative for chest pain, palpitations and orthopnea.   Gastrointestinal:  Negative for diarrhea, nausea and vomiting.   Neurological:  Positive for headaches. Negative for dizziness.     Medications, Allergies, and current problem list reviewed today in Epic         Objective     Blood Pressure 104/60   Pulse 89   Temperature 36.8 °C (98.3 °F) (Temporal)   Respiration 14   Height 1.88 m (6' 2\")   Weight 79.1 kg (174 lb 6.1 oz)   Oxygen Saturation 98%   Body Mass Index 22.39 kg/m²      Physical Exam  Vitals reviewed.   Constitutional:       General: He is not in acute distress.     Appearance: He is well-developed. He is not ill-appearing, toxic-appearing or diaphoretic.      Comments: Mark Anthony appears fatigued.   HENT:      Right " Ear: Tympanic membrane, ear canal and external ear normal. There is no impacted cerumen.      Left Ear: Tympanic membrane, ear canal and external ear normal. There is no impacted cerumen.      Nose: Congestion and rhinorrhea present.      Mouth/Throat:      Mouth: Mucous membranes are moist.      Pharynx: No oropharyngeal exudate or posterior oropharyngeal erythema.   Eyes:      General: No scleral icterus.        Right eye: No discharge.         Left eye: No discharge.      Conjunctiva/sclera: Conjunctivae normal.   Neck:      Thyroid: No thyromegaly.      Vascular: No JVD.      Trachea: No tracheal deviation.   Cardiovascular:      Rate and Rhythm: Normal rate and regular rhythm.      Heart sounds: Normal heart sounds. No murmur heard.     No friction rub. No gallop.   Pulmonary:      Effort: Pulmonary effort is normal. No respiratory distress.      Breath sounds: Normal breath sounds. No stridor. No wheezing, rhonchi or rales.   Chest:      Chest wall: No tenderness.   Musculoskeletal:      Cervical back: Neck supple.   Lymphadenopathy:      Cervical: No cervical adenopathy.   Skin:     General: Skin is warm and dry.      Coloration: Skin is not jaundiced.   Neurological:      Mental Status: He is alert and oriented to person, place, and time.   Psychiatric:         Mood and Affect: Mood normal.                             Assessment & Plan        1. COVID    - Nirmatrelvir&Ritonavir 300/100 20 x 150 MG & 10 x 100MG Tablet Therapy Pack; Take 300 mg nirmatrelvir (two 150 mg tablets) with 100 mg ritonavir (one 100 mg tablet) by mouth, with all three tablets taken together twice daily for 5 days.  Dispense: 30 Each; Refill: 0    Advised Mark Anthony that based on the history and exam findings, this is covid, a viral illness.  There is no indication for antibiotics at this time.  Differential and secondary risks/complitations reviewed.  At home isolation and return to work guidelines reviewed.  Discussed covid antiviral  treatment options including EUA status (not FDA approved), risks, benefits, possibility of unknown risks, alternatives, and indications for use.  They wish to proceed with antiviral treatment at this time.  Paxlovid as prescribed.  FDA information sheet for patients, parents and caregivers provided.    OTC cold medications prn symptom management.  OTC NSAIDs or tylenol prn fever, pain.    Maintain adequate po hydration.  RTC in 7-10 days if symptoms persist, sooner if worse.  ED precautions reviewed.  He verbalized understanding of and agreed with plan of care.

## 2024-04-20 ENCOUNTER — HOSPITAL ENCOUNTER (OUTPATIENT)
Dept: LAB | Facility: MEDICAL CENTER | Age: 34
End: 2024-04-20
Attending: FAMILY MEDICINE
Payer: COMMERCIAL

## 2024-04-20 LAB
BASOPHILS # BLD AUTO: 0.7 % (ref 0–1.8)
BASOPHILS # BLD: 0.04 K/UL (ref 0–0.12)
CHOLEST SERPL-MCNC: 133 MG/DL (ref 100–199)
EOSINOPHIL # BLD AUTO: 0.32 K/UL (ref 0–0.51)
EOSINOPHIL NFR BLD: 5.4 % (ref 0–6.9)
ERYTHROCYTE [DISTWIDTH] IN BLOOD BY AUTOMATED COUNT: 44.2 FL (ref 35.9–50)
ERYTHROCYTE [SEDIMENTATION RATE] IN BLOOD BY WESTERGREN METHOD: 8 MM/HOUR (ref 0–20)
HCT VFR BLD AUTO: 43.5 % (ref 42–52)
HDLC SERPL-MCNC: 44 MG/DL
HGB BLD-MCNC: 14.6 G/DL (ref 14–18)
HIV 1+2 AB+HIV1 P24 AG SERPL QL IA: NORMAL
IMM GRANULOCYTES # BLD AUTO: 0.01 K/UL (ref 0–0.11)
IMM GRANULOCYTES NFR BLD AUTO: 0.2 % (ref 0–0.9)
LDLC SERPL CALC-MCNC: 79 MG/DL
LYMPHOCYTES # BLD AUTO: 1.47 K/UL (ref 1–4.8)
LYMPHOCYTES NFR BLD: 24.6 % (ref 22–41)
MCH RBC QN AUTO: 30.5 PG (ref 27–33)
MCHC RBC AUTO-ENTMCNC: 33.6 G/DL (ref 32.3–36.5)
MCV RBC AUTO: 91 FL (ref 81.4–97.8)
MONOCYTES # BLD AUTO: 0.48 K/UL (ref 0–0.85)
MONOCYTES NFR BLD AUTO: 8 % (ref 0–13.4)
NEUTROPHILS # BLD AUTO: 3.66 K/UL (ref 1.82–7.42)
NEUTROPHILS NFR BLD: 61.1 % (ref 44–72)
NRBC # BLD AUTO: 0 K/UL
NRBC BLD-RTO: 0 /100 WBC (ref 0–0.2)
PLATELET # BLD AUTO: 326 K/UL (ref 164–446)
PMV BLD AUTO: 10.2 FL (ref 9–12.9)
RBC # BLD AUTO: 4.78 M/UL (ref 4.7–6.1)
RHEUMATOID FACT SER IA-ACNC: <10 IU/ML (ref 0–14)
T PALLIDUM AB SER QL IA: NORMAL
TRIGL SERPL-MCNC: 48 MG/DL (ref 0–149)
WBC # BLD AUTO: 6 K/UL (ref 4.8–10.8)

## 2024-04-20 PROCEDURE — 86256 FLUORESCENT ANTIBODY TITER: CPT

## 2024-04-20 PROCEDURE — 83516 IMMUNOASSAY NONANTIBODY: CPT | Mod: 91

## 2024-04-20 PROCEDURE — 86431 RHEUMATOID FACTOR QUANT: CPT

## 2024-04-20 PROCEDURE — 86225 DNA ANTIBODY NATIVE: CPT

## 2024-04-20 PROCEDURE — 87529 HSV DNA AMP PROBE: CPT | Mod: 91

## 2024-04-20 PROCEDURE — 87661 TRICHOMONAS VAGINALIS AMPLIF: CPT

## 2024-04-20 PROCEDURE — 86200 CCP ANTIBODY: CPT

## 2024-04-20 PROCEDURE — 87389 HIV-1 AG W/HIV-1&-2 AB AG IA: CPT

## 2024-04-20 PROCEDURE — 87591 N.GONORRHOEAE DNA AMP PROB: CPT

## 2024-04-20 PROCEDURE — 86003 ALLG SPEC IGE CRUDE XTRC EA: CPT | Mod: 91

## 2024-04-20 PROCEDURE — 85025 COMPLETE CBC W/AUTO DIFF WBC: CPT

## 2024-04-20 PROCEDURE — 87491 CHLMYD TRACH DNA AMP PROBE: CPT

## 2024-04-20 PROCEDURE — 36415 COLL VENOUS BLD VENIPUNCTURE: CPT

## 2024-04-20 PROCEDURE — 86235 NUCLEAR ANTIGEN ANTIBODY: CPT | Mod: 91

## 2024-04-20 PROCEDURE — 85652 RBC SED RATE AUTOMATED: CPT

## 2024-04-20 PROCEDURE — 86780 TREPONEMA PALLIDUM: CPT

## 2024-04-20 PROCEDURE — 86812 HLA TYPING A B OR C: CPT

## 2024-04-20 PROCEDURE — 80061 LIPID PANEL: CPT

## 2024-04-20 PROCEDURE — 82785 ASSAY OF IGE: CPT

## 2024-04-21 LAB
C TRACH DNA SPEC QL NAA+PROBE: NEGATIVE
N GONORRHOEA DNA SPEC QL NAA+PROBE: NEGATIVE
SPECIMEN SOURCE: NORMAL

## 2024-04-22 LAB — CCP IGA+IGG SERPL IA-ACNC: 7 UNITS (ref 0–19)

## 2024-04-23 LAB
ALMOND IGE QN: <0.1 KU/L
AVOCADO IGE QN: <0.1 KU/L
BANANA IGE QN: <0.1 KU/L
CELERY IGE QN: <0.1 KU/L
CENTROMERE IGG TITR SER IF: 2 AU/ML (ref 0–40)
CHESTNUT IGE QN: <0.1 KU/L
CHROMATIN IGG SERPL-ACNC: 24 UNITS (ref 0–19)
COCONUT IGE QN: <0.1 KU/L
COW MILK IGE QN: <0.1 KU/L
DEPRECATED MISC ALLERGEN IGE RAST QL: NORMAL
DSDNA AB TITR SER CLIF: NORMAL {TITER}
EGG WHITE IGE QN: <0.1 KU/L
ENA JO1 AB TITR SER: 3 AU/ML (ref 0–40)
ENA SCL70 IGG SER QL: 5 AU/ML (ref 0–40)
ENA SM IGG SER-ACNC: 12 AU/ML (ref 0–40)
ENA SS-A 60KD AB SER-ACNC: 29 AU/ML (ref 0–40)
ENA SS-A IGG SER QL: 12 AU/ML (ref 0–40)
ENA SS-B IGG SER IA-ACNC: 22 AU/ML (ref 0–40)
GRAPE IGE QN: <0.1 KU/L
HLA-B27 QL FC: NEGATIVE
HSV1 DNA CSF QL NAA+PROBE: NOT DETECTED
HSV2 DNA CSF QL NAA+PROBE: NOT DETECTED
IGE SERPL-ACNC: 3 KU/L
KIWIFRUIT IGE QN: <0.1 KU/L
OAT IGE QN: <0.1 KU/L
PAPAYA IGE QN: <0.1 KU/L
PEANUT IGE QN: <0.1 KU/L
PECAN/HICK NUT IGE QN: <0.1 KU/L
POTATO IGE QN: <0.1 KU/L
SESAME SEED IGE QN: <0.1 KU/L
SOYBEAN IGE QN: <0.1 KU/L
SPEC CONTAINER SPEC: NORMAL
SPECIMEN SOURCE: NORMAL
SPECIMEN SOURCE: NORMAL
T VAGINALIS RRNA SPEC QL NAA+PROBE: NEGATIVE
TOMATO IGE QN: <0.1 KU/L
U1 SNRNP IGG SER QL: 6 UNITS (ref 0–19)
WATERMELON IGE QN: <0.1 KU/L
WHEAT IGE QN: <0.1 KU/L

## 2024-04-24 LAB — DSDNA IGG TITR SER CLIF: NORMAL {TITER}

## 2024-05-23 ENCOUNTER — OFFICE VISIT (OUTPATIENT)
Dept: MEDICAL GROUP | Facility: MEDICAL CENTER | Age: 34
End: 2024-05-23
Payer: COMMERCIAL

## 2024-05-23 VITALS
TEMPERATURE: 98.3 F | WEIGHT: 176.37 LBS | HEART RATE: 81 BPM | DIASTOLIC BLOOD PRESSURE: 56 MMHG | BODY MASS INDEX: 22.63 KG/M2 | RESPIRATION RATE: 16 BRPM | HEIGHT: 74 IN | SYSTOLIC BLOOD PRESSURE: 100 MMHG | OXYGEN SATURATION: 98 %

## 2024-05-23 DIAGNOSIS — Z23 NEED FOR VACCINATION: ICD-10-CM

## 2024-05-23 DIAGNOSIS — M45.9 ANKYLOSING SPONDYLITIS, UNSPECIFIED SITE OF SPINE (HCC): ICD-10-CM

## 2024-05-23 DIAGNOSIS — Z13.1 SCREENING FOR DIABETES MELLITUS: ICD-10-CM

## 2024-05-23 DIAGNOSIS — Z01.84 IMMUNITY STATUS TESTING: ICD-10-CM

## 2024-05-23 DIAGNOSIS — Z11.59 NEED FOR HEPATITIS C SCREENING TEST: ICD-10-CM

## 2024-05-23 DIAGNOSIS — Z00.00 ANNUAL PHYSICAL EXAM: ICD-10-CM

## 2024-05-23 DIAGNOSIS — M05.79 RHEUMATOID ARTHRITIS INVOLVING MULTIPLE SITES WITH POSITIVE RHEUMATOID FACTOR (HCC): ICD-10-CM

## 2024-05-23 PROCEDURE — 3074F SYST BP LT 130 MM HG: CPT | Performed by: FAMILY MEDICINE

## 2024-05-23 PROCEDURE — 90715 TDAP VACCINE 7 YRS/> IM: CPT | Performed by: FAMILY MEDICINE

## 2024-05-23 PROCEDURE — 3078F DIAST BP <80 MM HG: CPT | Performed by: FAMILY MEDICINE

## 2024-05-23 PROCEDURE — 90471 IMMUNIZATION ADMIN: CPT | Performed by: FAMILY MEDICINE

## 2024-05-23 PROCEDURE — 99395 PREV VISIT EST AGE 18-39: CPT | Mod: 25 | Performed by: FAMILY MEDICINE

## 2024-05-23 ASSESSMENT — ENCOUNTER SYMPTOMS
SPUTUM PRODUCTION: 0
EYE PAIN: 0
COUGH: 0
FOCAL WEAKNESS: 0
DIARRHEA: 0
VOMITING: 0
NERVOUS/ANXIOUS: 0
PALPITATIONS: 0
MYALGIAS: 0
WHEEZING: 0
SHORTNESS OF BREATH: 0
ABDOMINAL PAIN: 0
SENSORY CHANGE: 0
DIZZINESS: 0
CONSTIPATION: 0
NAUSEA: 0
SINUS PAIN: 0
HEMOPTYSIS: 0
HEADACHES: 0
FEVER: 0
SORE THROAT: 0
EYE REDNESS: 0
DEPRESSION: 0
CHILLS: 0
EYE DISCHARGE: 0

## 2024-05-23 ASSESSMENT — PATIENT HEALTH QUESTIONNAIRE - PHQ9: CLINICAL INTERPRETATION OF PHQ2 SCORE: 0

## 2024-05-23 NOTE — PROGRESS NOTES
Verbal consent was acquired by the patient to use [employer] ambient listening note generation during this visit.        Mark Anthony was seen today for establish care.    Diagnoses and all orders for this visit:    Annual physical exam    Need for hepatitis C screening test  -     HEP C VIRUS ANTIBODY; Future    Immunity status testing  -     HEP B SURFACE AB; Future  -     VARICELLA ZOSTER IGG AB; Future    Screening for diabetes mellitus  -     Comp Metabolic Panel; Future  -     HEMOGLOBIN A1C; Future    Ankylosing spondylitis, unspecified site of spine (HCC)  -     Referral to Rheumatology    Need for vaccination  -     Tdap =>6yo IM    Rheumatoid arthritis involving multiple sites with positive rheumatoid factor (HCC)  -     Referral to Rheumatology         Assessment & Plan  1. Annual physical examination.  The patient's screenings are current. He is due for vaccinations. I will conduct tests for hepatitis B titers and chickenpox vaccine titers. Anticipatory guidance has been provided.    2. Ankylosing spondylosis and rheumatoid arthritis.  These chronic conditions are currently stable. Recent blood tests have returned normal results, with the exception of a positive chromatin antibody. I will request previous records from [facility] regarding his diagnosis. A referral to rheumatology has been made to establish care.    Follow-up  The patient is scheduled for a follow-up visit in 1 year for his annual physical examination, or sooner if necessary.          Chief Complaint   Patient presents with    [facility]       History of Present Illness  The patient is a 33-year-old male who is here for establishing care as well as recent blood test that was done on 04/20/2024 and annual physical.    The patient has been diagnosed with rheumatoid arthritis and ankylosing spondylitis, although he has not yet consulted a rheumatologist due to frequent travel. He recently relocated from [place] and is seeking to  reestablish his care. His diagnosis was made when he was very young. 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 Despite using shoe inserts, he has recently developed ankle pain and has begun to develop knee pain. He was diagnosed with COVID-19 in 11/2023, which exacerbated his symptoms, albeit manageable, and he did not take any medication at that time. His previous records for his rheumatoid arthritis [facility] [place].    The patient reported waking up with a twitch in his eyes this morning and is seeking advice on whether this is a normal occurrence.    The patient's last tetanus vaccine was administered  in [place]. He did not receive the varicella vaccine during his childhood. He has noticed an increased sensitivity to certain sugars.        Health Maintenance  Advanced directive: n/a  Osteoporosis Screen/ DEXA: n/a  PT/vit D for falls prevention: n/a   Cholesterol Screening:   Lab Results   Component Value Date/Time    CHOLSTRLTOT 133 04/20/2024 0856    TRIGLYCERIDE 48 04/20/2024 0856    HDL 44 04/20/2024 0856    LDL 79 04/20/2024 0856      Diabetes Screening: Ordered A1c  AAA Screening (65 to 74 year male): n/a   Aspirin Use: n/a    Below anticipatory guidance discussed with patient  Diet:  counseled regarding eating healthy diet  Exercise: Counseled regarding exercise 150 minutes in a week  Substance use: Marijuana    Cancer screening  Colorectal Cancer Screening: Colon cancer screening start after age 45  Lung Cancer Screening: Does not meet criteria for lung cancer screening  Prostate Cancer Screening/PSA: Prostate cancer screening start after age 50      Infectious disease screening/Immunizations  --STI Screening: Recently had STD testing  --Immunizations: Check varicella titers and hepatitis B titers       Review of Systems   Constitutional:  Negative for chills, fever and malaise/fatigue.  "  HENT:  Negative for ear discharge, ear pain, hearing loss, sinus pain and sore throat.    Eyes:  Negative for pain, discharge and redness.   Respiratory:  Negative for cough, hemoptysis, sputum production, shortness of breath and wheezing.    Cardiovascular:  Negative for chest pain, palpitations and leg swelling.   Gastrointestinal:  Negative for abdominal pain, constipation, diarrhea, nausea and vomiting.   Genitourinary:  Negative for dysuria, frequency and urgency.   Musculoskeletal:  Positive for joint pain. Negative for myalgias.   Skin:  Negative for itching and rash.   Neurological:  Negative for dizziness, sensory change, focal weakness and headaches.   Psychiatric/Behavioral:  Negative for depression. The patient is not nervous/anxious.           No current outpatient medications on file.     No current facility-administered medications for this visit.        No Known Allergies        /56   Pulse 81   Temp 36.8 °C (98.3 °F)   Resp 16   Ht 1.88 m (6' 2\")   Wt 80 kg (176 lb 5.9 oz)   SpO2 98%  Body mass index is 22.64 kg/m².      Physical Exam  Constitutional:       Appearance: Normal appearance. He is well-developed and well-groomed.   HENT:      Head: Normocephalic and atraumatic.      Right Ear: Tympanic membrane, ear canal and external ear normal.      Left Ear: Tympanic membrane, ear canal and external ear normal.      Nose: Nose normal. No congestion or rhinorrhea.      Mouth/Throat:      Mouth: Mucous membranes are moist.      Pharynx: No oropharyngeal exudate or posterior oropharyngeal erythema.   Eyes:      General:         Right eye: No discharge.         Left eye: No discharge.      Conjunctiva/sclera: Conjunctivae normal.   Cardiovascular:      Rate and Rhythm: Normal rate and regular rhythm.      Heart sounds: Normal heart sounds. No murmur heard.     No friction rub. No gallop.   Pulmonary:      Effort: Pulmonary effort is normal. No respiratory distress.      Breath sounds: " Normal breath sounds. No wheezing or rales.   Abdominal:      General: There is no distension.      Tenderness: There is no abdominal tenderness.   Musculoskeletal:      Right lower leg: No edema.      Left lower leg: No edema.   Skin:     Findings: No rash.   Neurological:      General: No focal deficit present.      Mental Status: He is alert. Mental status is at baseline.      Gait: Gait is intact.   Psychiatric:         Mood and Affect: Mood and affect normal.         Behavior: Behavior normal.            Please note that this dictation was created using voice recognition software. I have made every reasonable attempt to correct obvious errors, but I expect that there are errors of grammar and possibly content that I did not discover before finalizing the note.

## 2024-06-01 ENCOUNTER — HOSPITAL ENCOUNTER (OUTPATIENT)
Dept: LAB | Facility: MEDICAL CENTER | Age: 34
End: 2024-06-01
Attending: FAMILY MEDICINE
Payer: COMMERCIAL

## 2024-06-01 DIAGNOSIS — Z11.59 NEED FOR HEPATITIS C SCREENING TEST: ICD-10-CM

## 2024-06-01 DIAGNOSIS — Z13.1 SCREENING FOR DIABETES MELLITUS: ICD-10-CM

## 2024-06-01 DIAGNOSIS — Z01.84 IMMUNITY STATUS TESTING: ICD-10-CM

## 2024-06-01 LAB
ALBUMIN SERPL BCP-MCNC: 4.4 G/DL (ref 3.2–4.9)
ALBUMIN/GLOB SERPL: 1.2 G/DL
ALP SERPL-CCNC: 62 U/L (ref 30–99)
ALT SERPL-CCNC: 14 U/L (ref 2–50)
ANION GAP SERPL CALC-SCNC: 10 MMOL/L (ref 7–16)
AST SERPL-CCNC: 19 U/L (ref 12–45)
BILIRUB SERPL-MCNC: 0.5 MG/DL (ref 0.1–1.5)
BUN SERPL-MCNC: 15 MG/DL (ref 8–22)
CALCIUM ALBUM COR SERPL-MCNC: 9.2 MG/DL (ref 8.5–10.5)
CALCIUM SERPL-MCNC: 9.5 MG/DL (ref 8.5–10.5)
CHLORIDE SERPL-SCNC: 105 MMOL/L (ref 96–112)
CO2 SERPL-SCNC: 25 MMOL/L (ref 20–33)
CREAT SERPL-MCNC: 0.66 MG/DL (ref 0.5–1.4)
EST. AVERAGE GLUCOSE BLD GHB EST-MCNC: 111 MG/DL
GFR SERPLBLD CREATININE-BSD FMLA CKD-EPI: 126 ML/MIN/1.73 M 2
GLOBULIN SER CALC-MCNC: 3.6 G/DL (ref 1.9–3.5)
GLUCOSE SERPL-MCNC: 92 MG/DL (ref 65–99)
HBA1C MFR BLD: 5.5 % (ref 4–5.6)
HBV SURFACE AB SERPL IA-ACNC: 1374 MIU/ML (ref 0–10)
HCV AB SER QL: NORMAL
POTASSIUM SERPL-SCNC: 4.2 MMOL/L (ref 3.6–5.5)
PROT SERPL-MCNC: 8 G/DL (ref 6–8.2)
SODIUM SERPL-SCNC: 140 MMOL/L (ref 135–145)

## 2024-06-01 PROCEDURE — 86803 HEPATITIS C AB TEST: CPT

## 2024-06-01 PROCEDURE — 86787 VARICELLA-ZOSTER ANTIBODY: CPT

## 2024-06-01 PROCEDURE — 36415 COLL VENOUS BLD VENIPUNCTURE: CPT

## 2024-06-01 PROCEDURE — 83036 HEMOGLOBIN GLYCOSYLATED A1C: CPT

## 2024-06-01 PROCEDURE — 80053 COMPREHEN METABOLIC PANEL: CPT

## 2024-06-01 PROCEDURE — 86706 HEP B SURFACE ANTIBODY: CPT

## 2024-06-03 LAB — VZV IGG SER IA-ACNC: 94.1 IV

## 2024-06-10 DIAGNOSIS — Z01.00 EYE EXAM, ROUTINE: ICD-10-CM

## 2024-07-22 DIAGNOSIS — M05.79 RHEUMATOID ARTHRITIS INVOLVING MULTIPLE SITES WITH POSITIVE RHEUMATOID FACTOR (HCC): ICD-10-CM

## 2024-07-31 ENCOUNTER — OFFICE VISIT (OUTPATIENT)
Dept: MEDICAL GROUP | Facility: MEDICAL CENTER | Age: 34
End: 2024-07-31
Payer: COMMERCIAL

## 2024-07-31 VITALS
DIASTOLIC BLOOD PRESSURE: 70 MMHG | WEIGHT: 173.83 LBS | BODY MASS INDEX: 22.31 KG/M2 | HEIGHT: 74 IN | OXYGEN SATURATION: 98 % | SYSTOLIC BLOOD PRESSURE: 116 MMHG | TEMPERATURE: 97.5 F | HEART RATE: 66 BPM

## 2024-07-31 DIAGNOSIS — M05.79 RHEUMATOID ARTHRITIS INVOLVING MULTIPLE SITES WITH POSITIVE RHEUMATOID FACTOR (HCC): ICD-10-CM

## 2024-07-31 DIAGNOSIS — L20.84 INTRINSIC ECZEMA: ICD-10-CM

## 2024-07-31 DIAGNOSIS — M45.9 ANKYLOSING SPONDYLITIS, UNSPECIFIED SITE OF SPINE (HCC): ICD-10-CM

## 2024-07-31 RX ORDER — CLOBETASOL PROPIONATE 0.5 MG/G
1 CREAM TOPICAL 2 TIMES DAILY
Qty: 45 G | Refills: 3 | Status: SHIPPED | OUTPATIENT
Start: 2024-07-31

## 2024-07-31 ASSESSMENT — ENCOUNTER SYMPTOMS
FEVER: 0
CHILLS: 0

## 2024-07-31 ASSESSMENT — FIBROSIS 4 INDEX: FIB4 SCORE: 0.53

## 2024-08-05 ENCOUNTER — HOSPITAL ENCOUNTER (EMERGENCY)
Facility: MEDICAL CENTER | Age: 34
End: 2024-08-05
Attending: EMERGENCY MEDICINE
Payer: COMMERCIAL

## 2024-08-05 VITALS
WEIGHT: 167.77 LBS | BODY MASS INDEX: 21.53 KG/M2 | SYSTOLIC BLOOD PRESSURE: 127 MMHG | HEIGHT: 74 IN | OXYGEN SATURATION: 98 % | HEART RATE: 92 BPM | DIASTOLIC BLOOD PRESSURE: 70 MMHG | RESPIRATION RATE: 16 BRPM | TEMPERATURE: 97.9 F
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
## 2024-08-05 DIAGNOSIS — S50.861A INSECT BITE OF RIGHT FOREARM, INITIAL ENCOUNTER: ICD-10-CM

## 2024-08-05 PROCEDURE — 700102 HCHG RX REV CODE 250 W/ 637 OVERRIDE(OP): Performed by: EMERGENCY MEDICINE

## 2024-08-05 PROCEDURE — 99283 EMERGENCY DEPT VISIT LOW MDM: CPT

## 2024-08-05 RX ORDER — DEXAMETHASONE 4 MG/1
12 TABLET ORAL ONCE
Status: COMPLETED | OUTPATIENT
Start: 2024-08-05 | End: 2024-08-05

## 2024-08-05 RX ADMIN — DEXAMETHASONE 12 MG: 4 TABLET ORAL at 23:10

## 2024-08-05 ASSESSMENT — PAIN DESCRIPTION - PAIN TYPE
TYPE: ACUTE PAIN
TYPE: ACUTE PAIN

## 2024-08-05 ASSESSMENT — FIBROSIS 4 INDEX: FIB4 SCORE: 0.53

## 2024-08-06 NOTE — ED PROVIDER NOTES
"ED Provider Note    CHIEF COMPLAINT  Chief Complaint   Patient presents with    Spider Bite       EXTERNAL RECORDS REVIEWED  Outpatient notes for rheumatoid arthritis, previously on steroids    HPI/ROS  LIMITATION TO HISTORY   Select: : None  OUTSIDE HISTORIAN(S):      Mark Anthony Montez is a 33 y.o. male who presents to the ED with a right forearm redness and swelling.  The patient states he started noticing a bite on Saturday, yesterday it started getting more swollen, it feels tight, it burns some, slightly itching.  Patient denies any muscle cramping, chest pain, shortness of breath.    PAST MEDICAL HISTORY       SURGICAL HISTORY   has a past surgical history that includes other.    FAMILY HISTORY  Family History   Problem Relation Age of Onset    Cancer Maternal Grandmother         brain       SOCIAL HISTORY  Social History     Tobacco Use    Smoking status: Former     Types: Cigarettes    Smokeless tobacco: Never    Tobacco comments:     2 and half pac for 5 years   Substance and Sexual Activity    Alcohol use: Yes     Comment: occ    Drug use: Yes     Types: Marijuana    Sexual activity: Not Currently       CURRENT MEDICATIONS  Home Medications       Reviewed by Alexander Clark R.N. (Registered Nurse) on 08/05/24 at 2240  Med List Status: Partial     Medication Last Dose Status   clobetasol (TEMOVATE) 0.05 % Cream  Active                    ALLERGIES  No Known Allergies    PHYSICAL EXAM  VITAL SIGNS: /79   Pulse (!) 104   Temp 36.7 °C (98.1 °F) (Temporal)   Resp 14   Ht 1.88 m (6' 2\")   Wt 76.1 kg (167 lb 12.3 oz)   SpO2 97%   BMI 21.54 kg/m²    Will do open neuro 33-year-old male appears in mild distress  The patient has a small ulcer on the radial aspect dorsal forearm distally.  He has some surrounding erythema going up to the mid forearm with some edema, no streaking, full range of motion, 2+ pulse      COURSE & MEDICAL DECISION MAKING    ASSESSMENT, COURSE AND PLAN  Care Narrative: Insect versus " spider bite, no evidence of black , no evidence of cellulitis or abscess.  Believe it is just inflammatory.  Will give the patient a dose of Decadron, have the patient take Benadryl anti-inflammatories at home.  Have the patient return with worsening symptoms.    DISPOSITION AND DISCUSSIONS  Decision tools and prescription drugs considered including, but not limited to: Antibiotics   .    FINAL DIAGNOSIS  1. Insect bite of right forearm, initial encounter         Electronically signed by: Alexander Brizuela M.D., 8/5/2024 10:55 PM

## 2024-08-13 ENCOUNTER — TELEPHONE (OUTPATIENT)
Dept: MEDICAL GROUP | Facility: MEDICAL CENTER | Age: 34
End: 2024-08-13
Payer: COMMERCIAL

## 2024-08-13 NOTE — TELEPHONE ENCOUNTER
Caller Name: Manish Montez  Call Back Number: [phone]    How would the patient prefer to be contacted with a response: Phone call OK to leave a detailed message    Patient's father called regarding the rheumatology referral that was sent for the patient. They would prefer not to be seen in [place] if possible and would like recommendations for somewhere in [place] instead. Manish, the father, would like a call back to discuss this matter. Thank you.

## 2024-08-14 NOTE — TELEPHONE ENCOUNTER
I called patient's father Manish who is on patient's chart to discuss treatment and plan.  I explained to him that he has to do blood test as requested by Kevin Dobson.  After his blood test we will determine

## 2024-08-24 ENCOUNTER — HOSPITAL ENCOUNTER (OUTPATIENT)
Dept: LAB | Facility: MEDICAL CENTER | Age: 34
End: 2024-08-24
Attending: FAMILY MEDICINE
Payer: COMMERCIAL

## 2024-08-24 DIAGNOSIS — M05.79 RHEUMATOID ARTHRITIS INVOLVING MULTIPLE SITES WITH POSITIVE RHEUMATOID FACTOR (HCC): ICD-10-CM

## 2024-08-24 LAB
CRP SERPL HS-MCNC: 0.52 MG/DL (ref 0–0.75)
ERYTHROCYTE [SEDIMENTATION RATE] IN BLOOD BY WESTERGREN METHOD: 14 MM/HOUR (ref 0–20)
RHEUMATOID FACT SER IA-ACNC: <10 IU/ML (ref 0–14)

## 2024-08-24 PROCEDURE — 85652 RBC SED RATE AUTOMATED: CPT

## 2024-08-24 PROCEDURE — 86200 CCP ANTIBODY: CPT

## 2024-08-24 PROCEDURE — 36415 COLL VENOUS BLD VENIPUNCTURE: CPT

## 2024-08-24 PROCEDURE — 86038 ANTINUCLEAR ANTIBODIES: CPT

## 2024-08-24 PROCEDURE — 86140 C-REACTIVE PROTEIN: CPT

## 2024-08-24 PROCEDURE — 86431 RHEUMATOID FACTOR QUANT: CPT

## 2024-08-26 LAB
CCP IGA+IGG SERPL IA-ACNC: 9 UNITS (ref 0–19)
NUCLEAR IGG SER QL IA: NORMAL

## 2024-09-06 ENCOUNTER — PATIENT MESSAGE (OUTPATIENT)
Dept: MEDICAL GROUP | Facility: MEDICAL CENTER | Age: 34
End: 2024-09-06
Payer: COMMERCIAL

## 2024-09-07 ENCOUNTER — PATIENT MESSAGE (OUTPATIENT)
Dept: MEDICAL GROUP | Facility: MEDICAL CENTER | Age: 34
End: 2024-09-07
Payer: COMMERCIAL

## 2024-09-09 RX ORDER — CELECOXIB 200 MG/1
200 CAPSULE ORAL 2 TIMES DAILY PRN
Qty: 60 CAPSULE | Refills: 1 | Status: SHIPPED | OUTPATIENT
Start: 2024-09-09

## 2024-12-19 ENCOUNTER — HOSPITAL ENCOUNTER (EMERGENCY)
Facility: MEDICAL CENTER | Age: 34
End: 2024-12-19
Payer: COMMERCIAL

## 2024-12-19 VITALS
SYSTOLIC BLOOD PRESSURE: 103 MMHG | OXYGEN SATURATION: 99 % | DIASTOLIC BLOOD PRESSURE: 71 MMHG | TEMPERATURE: 99.1 F | HEART RATE: 85 BPM | RESPIRATION RATE: 16 BRPM

## 2024-12-19 PROCEDURE — 302449 STATCHG TRIAGE ONLY (STATISTIC)

## 2024-12-20 NOTE — ED NOTES
Pt came to front triage desk and expressed the desire to leave without being seen. It was explained to the pt that he can come back anytime if the pain continues or gets worse. Pt left and refused to sign AMA consent form. Triage RN notified. Pt ambulated out of the ER on their own.    
male

## 2025-05-21 NOTE — PROGRESS NOTES
Date and time of surgery :  05/29/2025 7:30 am            Arrival Time:  6:30 am     Bring Picture ID and insurance card.  Please wear simple, loose fitting clothing to the hospital.   Do not bring valuables (money, credit cards, checkbooks, etc.)   Do not wear any makeup (including  eye makeup) and no nail polish or artificial nails on your fingers or toes.  DO NOT wear any jewelry or piercings on day of surgery.  All body piercing jewelry must be removed.  If you have dentures, they will be removed before going to the OR; we will provide you a container.  If you wear contact lenses or glasses, they will be removed; please bring a case for them.  Shower the evening before or morning of surgery with antibacterial soap.  You may brush your teeth and gargle the morning of surgery.    Aspirin, Ibuprofen, Advil, Naproxen, Vitamin E and other Anti-inflammatory products and supplements should be stopped for 5 -7days before surgery or as directed by your physician.  Ok to eat a light breakfast and ok to take medications morning of procedure.   Do not smoke or drink any alcoholic beverages 24 hours prior to surgery.  This includes NA Beer. Refrain from the usage of any recreational drugs, including non-prescribed prescription drugs.   You MUST plan for a responsible adult to stay on site while you are here and take you home after your surgery. You will not be allowed to leave alone or drive yourself home. It is strongly suggested someone stay with you the first 24 hrs. Your surgery will be cancelled if you do not have a ride home.  To help prevent infection, change your sheets the night before surgery.   If you  have a Living Will and Durable Power of  for Healthcare, please bring in a copy.  Notify your Surgeon if you develop any illness between now and time of surgery. Cough, cold, fever, sore throat, nausea, vomiting, etc.  Please notify your surgeon if you experience dizziness, shortness of breath or blurred  "Subjective:   Mark Anthony Montez  is a 31 y.o. male who presents for Toe Pain (Right foot, second toe, in the shower last night. Bottle of shampoo slipped out of hands and injured right foot, 2nd toe. )      HPI  Patient reports injury to second toe right foot that occurred last night while in the shower.  He states he dropped a large bottle of shampoo and it landed on top of second toe on right foot.  Patient complains of pain to toenail and toe.  Denies numbness tingling weakness.  Denies history of significant injury or surgery to right toe.  Denies other treatments tried.  Tried walking this morning and noted persistent discomfort.  Requests a work note for today.    Review of Systems   Constitutional: Negative for chills and fever.   Gastrointestinal: Negative for nausea and vomiting.   Musculoskeletal: Positive for joint pain ( right foot, 2nd toe).   Skin: Negative for rash.   Neurological: Negative for tingling and sensory change.       No Known Allergies     Objective:   /70 (BP Location: Left arm, Patient Position: Sitting, BP Cuff Size: Adult)   Pulse 84   Temp 36.6 °C (97.9 °F) (Temporal)   Resp 14   Ht 1.88 m (6' 2\")   Wt 80.3 kg (177 lb)   SpO2 96%   BMI 22.73 kg/m²     Physical Exam  Vitals and nursing note reviewed.   Constitutional:       General: He is not in acute distress.     Appearance: He is well-developed. He is not diaphoretic.   HENT:      Head: Normocephalic and atraumatic.      Right Ear: External ear normal.      Left Ear: External ear normal.      Nose: Nose normal.   Eyes:      General: No scleral icterus.        Right eye: No discharge.         Left eye: No discharge.      Conjunctiva/sclera: Conjunctivae normal.   Pulmonary:      Effort: Pulmonary effort is normal. No respiratory distress.   Musculoskeletal:         General: Normal range of motion.      Cervical back: Neck supple.        Feet:       Comments: Second digit, right foot, distal diffuse erythema and edema, " subungual hematoma, skin intact, normal sensation to light touch   Skin:     General: Skin is warm and dry.      Coloration: Skin is not pale.      Findings: No rash.   Neurological:      Mental Status: He is alert and oriented to person, place, and time.      Coordination: Coordination normal.       DX FOOT -   IMPRESSION:     No evidence of acute fracture or dislocation.        Exam Ended: 08/16/21  9:39 AM Last Resulted: 08/16/21  9:50 AM        Using an 18-gauge beveled needle and able to gently rotated over patient's subungual hematoma.  Trace amount of blood is expressed through area of trephination.  Patient complains of pain through procedure.  Patient inquires extensively about pain.      Assessment/Plan:   1. Injury of toe on right foot, initial encounter  - DX-FOOT-COMPLETE 3+ RIGHT; Future  Recommend conservative care, rest, ice, elevation, work on gentle ROM exercises, yasir tape, OTC NSAIDs/Tylenol, heel walking, supportive footwear, offered crutches but declined    -We reviewed recommendations for OTC NSAIDs alternating with Tylenol, yasir taping, supportive footwear, he is offered up to 3 days of work excuse.  Encouraged to follow-up with primary care or follow-up with sports medicine specialist with persistent pains-referral placed today due to patient concern.    Return to clinic with lack of resolution or progression of symptoms.      I have worn an N95 mask, gloves and eye protection for the entire encounter with this patient.     Differential diagnosis, natural history, supportive care, and indications for immediate follow-up discussed.

## 2025-07-31 ENCOUNTER — OFFICE VISIT (OUTPATIENT)
Dept: MEDICAL GROUP | Facility: MEDICAL CENTER | Age: 35
End: 2025-07-31
Payer: COMMERCIAL

## 2025-07-31 VITALS
WEIGHT: 181.66 LBS | SYSTOLIC BLOOD PRESSURE: 104 MMHG | TEMPERATURE: 96.9 F | OXYGEN SATURATION: 98 % | HEART RATE: 70 BPM | BODY MASS INDEX: 24.08 KG/M2 | HEIGHT: 73 IN | DIASTOLIC BLOOD PRESSURE: 62 MMHG

## 2025-07-31 DIAGNOSIS — R20.0 NUMBNESS AND TINGLING IN LEFT HAND: ICD-10-CM

## 2025-07-31 DIAGNOSIS — M25.551 RIGHT HIP PAIN: ICD-10-CM

## 2025-07-31 DIAGNOSIS — Z13.220 SCREENING FOR LIPID DISORDERS: ICD-10-CM

## 2025-07-31 DIAGNOSIS — Z13.1 SCREENING FOR DIABETES MELLITUS: ICD-10-CM

## 2025-07-31 DIAGNOSIS — H93.13 TINNITUS OF BOTH EARS: ICD-10-CM

## 2025-07-31 DIAGNOSIS — R06.02 SHORTNESS OF BREATH: ICD-10-CM

## 2025-07-31 DIAGNOSIS — M54.41 ACUTE BILATERAL LOW BACK PAIN WITH RIGHT-SIDED SCIATICA: ICD-10-CM

## 2025-07-31 DIAGNOSIS — M25.561 ACUTE PAIN OF RIGHT KNEE: ICD-10-CM

## 2025-07-31 DIAGNOSIS — Z00.00 ANNUAL PHYSICAL EXAM: Primary | ICD-10-CM

## 2025-07-31 DIAGNOSIS — R20.2 NUMBNESS AND TINGLING IN LEFT HAND: ICD-10-CM

## 2025-07-31 ASSESSMENT — ENCOUNTER SYMPTOMS
SORE THROAT: 0
HEMOPTYSIS: 0
CONSTIPATION: 0
NAUSEA: 0
SHORTNESS OF BREATH: 1
DIARRHEA: 0
DEPRESSION: 0
NERVOUS/ANXIOUS: 0
EYE DISCHARGE: 0
MYALGIAS: 0
EYE REDNESS: 0
FEVER: 0
FOCAL WEAKNESS: 0
SENSORY CHANGE: 0
EYE PAIN: 0
SINUS PAIN: 0
PALPITATIONS: 0
WHEEZING: 0
COUGH: 0
DIZZINESS: 0
VOMITING: 0
CHILLS: 0
SPUTUM PRODUCTION: 0
ABDOMINAL PAIN: 0
HEADACHES: 0

## 2025-07-31 ASSESSMENT — FIBROSIS 4 INDEX: FIB4 SCORE: 0.55

## 2025-07-31 ASSESSMENT — PATIENT HEALTH QUESTIONNAIRE - PHQ9: CLINICAL INTERPRETATION OF PHQ2 SCORE: 0

## 2025-07-31 NOTE — PROGRESS NOTES
Verbal consent was acquired by the patient to use [employer] ambient listening note generation during this visit.        Mark Anthony was seen today for annual exam.    Diagnoses and all orders for this visit:    Annual physical exam    Screening for diabetes mellitus  -     Comp Metabolic Panel; Future  -     HEMOGLOBIN A1C; Future    Screening for lipid disorders  -     Lipid Profile; Future    Acute pain of right knee  -     DX-KNEE 2- RIGHT; Future  -     Referral to Orthopedics    Right hip pain  -     DX-HIP-COMPLETE - UNILATERAL 2+ RIGHT; Future  -     Referral to Orthopedics    Acute bilateral low back pain with right-sided sciatica  -     DX-LUMBAR SPINE-2 OR 3 VIEWS; Future  -     Referral to Orthopedics    Tinnitus of both ears    Shortness of breath    Numbness and tingling in left hand         Assessment & Plan  1. Annual physical:  - Cholesterol levels are within normal range.  - Metabolic panel, A1c, and cholesterol tests will be ordered.  - No current medications.  - He will check with parents at home regarding vaccines.  - Anticipatory guidance discussed below in the note    2. Right knee pain:  New condition, unstable  - Reports right knee gives out when pressure is applied, with pain centralizing around the knee and extending down the side of the leg.  - Physical exam findings include pain in the knee and hip.  - Knee x-ray will be conducted to determine the cause.  - Referral to orthopedics placed    3. Hip pain:  New condition, unstable  - Experiences hip pain, particularly in the ball joint area.  - Physical exam findings include pain in the hip.  - Hip x-ray will be conducted to determine the cause.  - .  Referral to orthopedics    4. Numbness and tingling in left pinky and ring finger:  New condition, stable  - Reports numbness and tingling in left pinky and ring finger, improved but still intermittent.  - Symptoms have reduced in duration and frequency.  - Advised to increase physical activity  to improve blood circulation.  - Further evaluation will be considered if symptoms persist.    5. Tinnitus:  New condition, stable  New condition, stable  - Reports ringing in both ears.  - Ear exam is normal  - Symptoms may be related to smoking history.    6. Shortness of breath:   New condition, stable  - Reports shortness of breath, potentially related to recent smoking history.  - Lung exam is normal.  If symptoms persist will do chest x-ray        Follow-up in 6 weeks for lab follow-up and imaging follow-up      Chief Complaint   Patient presents with    Annual Exam     R leg giving out for last couple months   Numbness and tingling started in L pinky and ring finger after restarting smoking   Ringing in both ears off and on x 1 month   Difficulty chewing   SOB         History of Present Illness  The patient is a 34-year-old male who presents for an annual physical. He reports experiencing several symptoms and has a notable medical history.    Instability in Left Leg  - Experiences instability in his left leg    Numbness and Tingling in Left Pinky and Ring Finger  - Reports numbness and tingling in his left pinky and ring finger  - Numbness used to persist all day, now lasts for about an hour or two every three to four days  - Believes this is due to inactivity and poor blood circulation    Tinnitus  - Reports tinnitus in both ears    Difficulty Chewing  - Has difficulty chewing    Shortness of Breath  - Reports shortness of breath    Instability in Right Leg  - Experiences instability in his right leg when pressure is applied  - Pain typically localized around the knee  - Pain often radiates down the side of his leg    Hip Pain  - Reports right hip pain as well as lower back pain.  Has sometimes weakness in right leg    Borderline Diabetic  - Was previously informed that he was borderline diabetic    Tobacco Use  - Briefly resumed smoking due to job-related stress but has since quit after consuming up to two  packs    He is not currently on any medications.        Health Maintenance  Advanced directive: n/a  Osteoporosis Screen/ DEXA: n/a  PT/vit D for falls prevention: n/a   Cholesterol Screening:   Lab Results   Component Value Date/Time    CHOLSTRLTOT 133 04/20/2024 0856    TRIGLYCERIDE 48 04/20/2024 0856    HDL 44 04/20/2024 0856    LDL 79 04/20/2024 0856      Diabetes Screening:   Lab Results   Component Value Date/Time    HBA1C 5.5 06/01/2024 08:56 AM       AAA Screening (65 to 74 year male): n/a   Aspirin Use: n/a    Below anticipatory guidance discussed with patient  Diet:  counseled regarding eating healthy diet  Exercise: Counseled regarding exercise 150 minutes in a week    Cancer screening  Colorectal Cancer Screening: Colon cancer screening start at age 45  Lung Cancer Screening: Quit smoking  Prostate Cancer Screening/PSA: Prostate cancer screening start after age 50      Infectious disease screening/Immunizations  --STI Screening: None  --Immunizations: He will check at home in regards to his vaccines.       Review of Systems   Constitutional:  Negative for chills, fever and malaise/fatigue.   HENT:  Positive for tinnitus. Negative for ear discharge, ear pain, hearing loss, sinus pain and sore throat.    Eyes:  Negative for pain, discharge and redness.   Respiratory:  Positive for shortness of breath. Negative for cough, hemoptysis, sputum production and wheezing.    Cardiovascular:  Negative for chest pain, palpitations and leg swelling.   Gastrointestinal:  Negative for abdominal pain, constipation, diarrhea, nausea and vomiting.   Genitourinary:  Negative for dysuria, frequency and urgency.   Musculoskeletal:  Positive for joint pain. Negative for myalgias.        Lower back pain, right hip pain, right knee pain   Skin:  Negative for itching and rash.   Neurological:  Negative for dizziness, sensory change, focal weakness and headaches.   Psychiatric/Behavioral:  Negative for depression. The patient is  "not nervous/anxious.           No current outpatient medications on file.      Allergies[1]        /62 (BP Location: Left arm, Patient Position: Sitting, BP Cuff Size: Adult)   Pulse 70   Temp 36.1 °C (96.9 °F) (Temporal)   Ht 1.854 m (6' 1\")   Wt 82.4 kg (181 lb 10.5 oz)   SpO2 98%  Body mass index is 23.97 kg/m².      Physical Exam  Constitutional:       General: He is not in acute distress.     Appearance: Normal appearance. He is well-developed and well-groomed. He is not ill-appearing or diaphoretic.   HENT:      Head: Normocephalic and atraumatic.      Right Ear: Tympanic membrane, ear canal and external ear normal.      Left Ear: Tympanic membrane, ear canal and external ear normal.      Nose: Nose normal. No congestion or rhinorrhea.      Mouth/Throat:      Mouth: Mucous membranes are moist.      Pharynx: No oropharyngeal exudate or posterior oropharyngeal erythema.   Eyes:      General:         Right eye: No discharge.         Left eye: No discharge.      Conjunctiva/sclera: Conjunctivae normal.      Pupils: Pupils are equal, round, and reactive to light.   Neck:      Thyroid: No thyromegaly.   Cardiovascular:      Rate and Rhythm: Normal rate and regular rhythm.      Heart sounds: Normal heart sounds. No murmur heard.     No friction rub. No gallop.   Pulmonary:      Effort: Pulmonary effort is normal. No respiratory distress.      Breath sounds: Normal breath sounds. No wheezing or rales.   Abdominal:      General: There is no distension.      Tenderness: There is no guarding.   Musculoskeletal:         General: Normal range of motion.      Cervical back: Neck supple.      Right lower leg: No edema.      Left lower leg: No edema.      Comments: No tenderness on palpation at right knee, no swelling.  Mild right leg weakness on exam.  No tenderness on palpation at lower back   Lymphadenopathy:      Cervical: No cervical adenopathy.   Skin:     General: Skin is warm.      Findings: No erythema or " rash.   Neurological:      General: No focal deficit present.      Mental Status: He is alert. Mental status is at baseline.      Motor: No abnormal muscle tone.      Gait: Gait is intact.   Psychiatric:         Mood and Affect: Mood and affect normal.         Behavior: Behavior normal.         Judgment: Judgment normal.            Annual physical covers screening, immunizations,counseling.  Other problem list that was addressed during this visit including diagnostic labs, diagnostic imagings, medications will be billed as a regular visit which can have copay per patient insurance.    Please note that this dictation was created using voice recognition software. I have made every reasonable attempt to correct obvious errors, but I expect that there are errors of grammar and possibly content that I did not discover before finalizing the note.          [1] No Known Allergies

## 2025-08-13 ENCOUNTER — HOSPITAL ENCOUNTER (OUTPATIENT)
Dept: LAB | Facility: MEDICAL CENTER | Age: 35
End: 2025-08-13
Attending: FAMILY MEDICINE
Payer: COMMERCIAL

## 2025-08-13 DIAGNOSIS — Z13.1 SCREENING FOR DIABETES MELLITUS: ICD-10-CM

## 2025-08-13 DIAGNOSIS — Z13.220 SCREENING FOR LIPID DISORDERS: ICD-10-CM

## 2025-08-13 LAB
ALBUMIN SERPL BCP-MCNC: 4.2 G/DL (ref 3.2–4.9)
ALBUMIN/GLOB SERPL: 1.2 G/DL
ALP SERPL-CCNC: 54 U/L (ref 30–99)
ALT SERPL-CCNC: 11 U/L (ref 2–50)
ANION GAP SERPL CALC-SCNC: 10 MMOL/L (ref 7–16)
AST SERPL-CCNC: 21 U/L (ref 12–45)
BILIRUB SERPL-MCNC: 0.2 MG/DL (ref 0.1–1.5)
BUN SERPL-MCNC: 12 MG/DL (ref 8–22)
CALCIUM ALBUM COR SERPL-MCNC: 9.1 MG/DL (ref 8.5–10.5)
CALCIUM SERPL-MCNC: 9.3 MG/DL (ref 8.5–10.5)
CHLORIDE SERPL-SCNC: 104 MMOL/L (ref 96–112)
CHOLEST SERPL-MCNC: 130 MG/DL (ref 100–199)
CO2 SERPL-SCNC: 25 MMOL/L (ref 20–33)
CREAT SERPL-MCNC: 0.84 MG/DL (ref 0.5–1.4)
EST. AVERAGE GLUCOSE BLD GHB EST-MCNC: 108 MG/DL
FASTING STATUS PATIENT QL REPORTED: NORMAL
GFR SERPLBLD CREATININE-BSD FMLA CKD-EPI: 116 ML/MIN/1.73 M 2
GLOBULIN SER CALC-MCNC: 3.5 G/DL (ref 1.9–3.5)
GLUCOSE SERPL-MCNC: 88 MG/DL (ref 65–99)
HBA1C MFR BLD: 5.4 % (ref 4–5.6)
HDLC SERPL-MCNC: 42 MG/DL
LDLC SERPL CALC-MCNC: 71 MG/DL
POTASSIUM SERPL-SCNC: 4 MMOL/L (ref 3.6–5.5)
PROT SERPL-MCNC: 7.7 G/DL (ref 6–8.2)
SODIUM SERPL-SCNC: 139 MMOL/L (ref 135–145)
TRIGL SERPL-MCNC: 87 MG/DL (ref 0–149)

## 2025-08-13 PROCEDURE — 83036 HEMOGLOBIN GLYCOSYLATED A1C: CPT

## 2025-08-13 PROCEDURE — 80053 COMPREHEN METABOLIC PANEL: CPT

## 2025-08-13 PROCEDURE — 80061 LIPID PANEL: CPT

## 2025-08-13 PROCEDURE — 36415 COLL VENOUS BLD VENIPUNCTURE: CPT

## 2025-08-18 ENCOUNTER — HOSPITAL ENCOUNTER (OUTPATIENT)
Dept: RADIOLOGY | Facility: MEDICAL CENTER | Age: 35
End: 2025-08-18
Attending: FAMILY MEDICINE
Payer: COMMERCIAL

## 2025-08-18 DIAGNOSIS — M54.41 ACUTE BILATERAL LOW BACK PAIN WITH RIGHT-SIDED SCIATICA: ICD-10-CM

## 2025-08-18 DIAGNOSIS — M25.551 RIGHT HIP PAIN: ICD-10-CM

## 2025-08-18 DIAGNOSIS — M25.561 ACUTE PAIN OF RIGHT KNEE: ICD-10-CM

## 2025-08-18 PROCEDURE — 73502 X-RAY EXAM HIP UNI 2-3 VIEWS: CPT | Mod: RT

## 2025-08-18 PROCEDURE — 73560 X-RAY EXAM OF KNEE 1 OR 2: CPT | Mod: RT

## 2025-08-18 PROCEDURE — 72100 X-RAY EXAM L-S SPINE 2/3 VWS: CPT

## 2025-10-07 ENCOUNTER — APPOINTMENT (OUTPATIENT)
Dept: MEDICAL GROUP | Facility: MEDICAL CENTER | Age: 35
End: 2025-10-07
Payer: COMMERCIAL